# Patient Record
Sex: FEMALE | Race: WHITE | URBAN - METROPOLITAN AREA
[De-identification: names, ages, dates, MRNs, and addresses within clinical notes are randomized per-mention and may not be internally consistent; named-entity substitution may affect disease eponyms.]

---

## 2017-03-01 ENCOUNTER — OFFICE VISIT (OUTPATIENT)
Dept: FAMILY MEDICINE CLINIC | Age: 42
End: 2017-03-01

## 2017-03-01 ENCOUNTER — HOSPITAL ENCOUNTER (OUTPATIENT)
Dept: LAB | Age: 42
Discharge: HOME | End: 2017-03-01
Payer: COMMERCIAL

## 2017-03-01 VITALS
SYSTOLIC BLOOD PRESSURE: 100 MMHG | HEART RATE: 73 BPM | RESPIRATION RATE: 20 BRPM | TEMPERATURE: 99.4 F | WEIGHT: 113.2 LBS | DIASTOLIC BLOOD PRESSURE: 72 MMHG | OXYGEN SATURATION: 98 % | HEIGHT: 66 IN | BODY MASS INDEX: 18.19 KG/M2

## 2017-03-01 DIAGNOSIS — R04.0 EPISTAXIS: ICD-10-CM

## 2017-03-01 DIAGNOSIS — N94.6 MENSTRUAL CRAMP: ICD-10-CM

## 2017-03-01 DIAGNOSIS — E05.90 HYPERTHYROIDISM: Primary | ICD-10-CM

## 2017-03-01 DIAGNOSIS — E05.90 HYPERTHYROIDISM: ICD-10-CM

## 2017-03-01 LAB
T3FREE SERPL-MCNC: 3.3 PG/ML (ref 2.3–4.2)
T4 FREE SERPL-MCNC: 1.3 NG/DL (ref 0.7–1.5)
TSH SERPL DL<=0.05 MIU/L-ACNC: 0.18 UIU/ML (ref 0.36–3.74)

## 2017-03-01 PROCEDURE — 84443 ASSAY THYROID STIM HORMONE: CPT | Performed by: INTERNAL MEDICINE

## 2017-03-01 PROCEDURE — 84481 FREE ASSAY (FT-3): CPT | Performed by: INTERNAL MEDICINE

## 2017-03-01 PROCEDURE — 36415 COLL VENOUS BLD VENIPUNCTURE: CPT | Performed by: INTERNAL MEDICINE

## 2017-03-01 PROCEDURE — 84439 ASSAY OF FREE THYROXINE: CPT | Performed by: INTERNAL MEDICINE

## 2017-03-01 RX ORDER — HYDROCODONE BITARTRATE AND ACETAMINOPHEN 5; 325 MG/1; MG/1
1 TABLET ORAL
Qty: 30 TAB | Refills: 0 | Status: SHIPPED | OUTPATIENT
Start: 2017-03-01

## 2017-03-01 NOTE — PROGRESS NOTES
Assessment/Plan:    1. Menstrual cramp  -refilled  - HYDROcodone-acetaminophen (NORCO) 5-325 mg per tablet; Take 1 Tab by mouth daily as needed for Pain. Dispense: 30 Tab; Refill: 0    2. Hyperthyroidism, subclinical.  Consider thyroid uptake scan if remains abnormal.  - TSH 3RD GENERATION; Future  - T4, FREE; Future  - T3, FREE; Future    3. Epistaxis  -trial saline nasal spray    The plan was discussed with the patient. The patient verbalized understanding and is in agreement with the plan. All medication potential side effects were discussed with the patient. Health Maintenance:   Health Maintenance   Topic Date Due    DTaP/Tdap/Td series (1 - Tdap) 06/17/1998    INFLUENZA AGE 9 TO ADULT  08/01/2016    PAP AKA CERVICAL CYTOLOGY  07/01/2018     Cora Vera is a 46 y.o. female and presents with Medication Refill     Subjective:  Pt notes this past weekend, she had some abd pain. No n/v/diarrhea. Now resolved. About a month ago, she had a \"bad cold\" one month ago. Now resolve. However, since then she's been having nose bleeds in am.    Subclinical hyperthyroid - tsh low, t4/t3 nml. Thyroid Ab neg. Was sent to endo, but she didn't have good experience. Was supposed to get thyroid uptake scan, but had CT for suspected appendicitis and that got put off. She is requesting refill of norco for menstrual cramps    ROS:  Constitutional: No recent weight change. No weakness/fatigue. No f/c. Skin: No rashes, change in nails/hair, itching   HENT: No HA, dizziness. No hearing loss/tinnitus. No nasal congestion/discharge. +epistaxis   Eyes: No change in vision, double/blurred vision or eye pain/redness. Cardiovascular: No CP/palpitations. No RUSS/orthopnea/PND. Respiratory: No cough/sputum, dyspnea, wheezing. Gastointestinal: No dysphagia, reflux. No n/v. No constipation/diarrhea. No melena/rectal bleeding. Genitourinary: No dysuria, urinary hesitancy, nocturia, hematuria.   No incontinence. Musculoskeletal: No joint pain/stiffness. No muscle pain/tenderness. Endo: No heat/cold intolerance, no polyuria/polydypsia. Heme: No h/o anemia. No easy bleeding/bruising. Allergy/Immunology: No seasonal rhinitis. Denies frequent colds, sinus/ear infections. Neurological: No seizures/numbness/weakness. No paresthesias. Psychiatric:  No depression, anxiety. The problem list was updated as a part of today's visit. Patient Active Problem List   Diagnosis Code    Underweight R63.6    Hyperthyroidism E05.90    Diarrhea R19.7       The PSH, FH were reviewed. SH:  Social History   Substance Use Topics    Smoking status: Former Smoker    Smokeless tobacco: Never Used    Alcohol use No       Medications/Allergies:  Current Outpatient Prescriptions on File Prior to Visit   Medication Sig Dispense Refill    HYDROcodone-acetaminophen (NORCO) 5-325 mg per tablet Take 1 Tab by mouth daily as needed for Pain. 30 Tab 0    levonorgestrel-ethinyl estradiol (SEASONALE CONTRACEPTIVE) 0.15-30 mg-mcg per tablet Take  by mouth daily.  ibuprofen (MOTRIN) 800 mg tablet Take  by mouth every eight (8) hours as needed for Pain. No current facility-administered medications on file prior to visit. Allergies   Allergen Reactions    Erythromycin Nausea and Vomiting       Objective:  Visit Vitals    /72 (BP 1 Location: Left arm, BP Patient Position: Sitting)    Pulse 73    Temp 99.4 °F (37.4 °C) (Temporal)    Resp 20    Ht 5' 6\" (1.676 m)    Wt 113 lb 3.2 oz (51.3 kg)    LMP 02/01/2017    SpO2 98%    BMI 18.27 kg/m2      Constitutional: Well developed, nourished, no distress, alert, thin   HENT: Exterior ears and tympanic membranes normal bilaterally. Supple neck. No thyromegaly or lymphadenopathy. Oropharynx clear and moist mucous membranes. No obvious source of bleeding. Eyes: Conjunctiva normal. PERRL. CV: S1, S2.  RRR. No murmurs/rubs. No thrills palpated. No carotid bruits. Intact distal pulses. No edema. Pulm: No abnormalities on inspection. Clear to auscultation bilaterally. No wheezing/rhonchi. Normal effort. Labwork and Ancillary Studies:    CBC w/Diff  Lab Results   Component Value Date/Time    WBC 4.6 06/20/2016 10:25 AM    HGB 13.1 06/20/2016 10:25 AM    PLATELET 937 81/87/3003 10:25 AM         Basic Metabolic Profile/LFTs  Lab Results   Component Value Date/Time    Sodium 140 06/20/2016 10:25 AM    Potassium 4.2 06/20/2016 10:25 AM    Chloride 106 06/20/2016 10:25 AM    CO2 26 06/20/2016 10:25 AM    Anion gap 8 06/20/2016 10:25 AM    Glucose 82 06/20/2016 10:25 AM    BUN 10 06/20/2016 10:25 AM    Creatinine 0.66 06/20/2016 10:25 AM    BUN/Creatinine ratio 15 06/20/2016 10:25 AM    GFR est AA >60 06/20/2016 10:25 AM    GFR est non-AA >60 06/20/2016 10:25 AM    Calcium 8.4 06/20/2016 10:25 AM      Lab Results   Component Value Date/Time    ALT (SGPT) 11 06/20/2016 10:25 AM    AST (SGOT) 12 06/20/2016 10:25 AM    Alk.  phosphatase 34 06/20/2016 10:25 AM    Bilirubin, total 0.6 06/20/2016 10:25 AM       Cholesterol  Lab Results   Component Value Date/Time    Cholesterol, total 204 06/20/2016 10:25 AM    HDL Cholesterol 73 06/20/2016 10:25 AM    LDL, calculated 114.6 06/20/2016 10:25 AM    Triglyceride 82 06/20/2016 10:25 AM    CHOL/HDL Ratio 2.8 06/20/2016 10:25 AM

## 2017-03-01 NOTE — MR AVS SNAPSHOT
Visit Information Date & Time Provider Department Dept. Phone Encounter #  
 3/1/2017  9:15 AM Charlette Fabian, 3 [facility] [phone] [id number] Upcoming Health Maintenance Date Due DTaP/Tdap/Td series (1 - Tdap) 6/17/1998 INFLUENZA AGE 9 TO ADULT 8/1/2016 PAP AKA CERVICAL CYTOLOGY 7/1/2018 Allergies as of 3/1/2017  Review Complete On: 3/1/2017 By: Charlette Fabian MD  
  
 Severity Noted Reaction Type Reactions Erythromycin  01/22/2015    Nausea and Vomiting Current Immunizations  Never Reviewed No immunizations on file. Not reviewed this visit You Were Diagnosed With   
  
 Codes Comments Hyperthyroidism    -  Primary ICD-10-CM: E05.90 ICD-9-CM: 242.90 Menstrual cramp     ICD-10-CM: N94.6 ICD-9-CM: 754. 3 Vitals BP  
  
  
  
  
  
 100/72 (BP 1 Location: Left arm, BP Patient Position: Sitting) BMI and BSA Data Body Mass Index Body Surface Area  
 18.27 kg/m 2 1.55 m 2 Your Updated Medication List  
  
   
This list is accurate as of: 3/1/17  9:41 AM.  Always use your most recent med list.  
  
  
  
  
 HYDROcodone-acetaminophen 5-325 mg per tablet Commonly known as:  Saul Ill Take 1 Tab by mouth daily as needed for Pain. ibuprofen 800 mg tablet Commonly known as:  MOTRIN Take  by mouth every eight (8) hours as needed for Pain. SEASONALE CONTRACEPTIVE 0.15 mg-30 mcg 3mpk Generic drug:  levonorgestrel-ethinyl estradiol Take  by mouth daily. Prescriptions Printed Refills HYDROcodone-acetaminophen (NORCO) 5-325 mg per tablet 0 Sig: Take 1 Tab by mouth daily as needed for Pain. Class: Print Route: Oral  
  
To-Do List   
 03/01/2017 Lab:  T3, FREE   
  
 03/01/2017 Lab:  T4, FREE   
  
 03/01/2017 Lab:  TSH 3RD GENERATION [facility]cing [facility]!    
 Dear Hermes Anand: 
 Thank you for requesting a [employer] account. Our records indicate that you already have an active [employer] account. You can access your account anytime at [email or web address] Did you know that you can access your hospital and ER discharge instructions at any time in [employer]? You can also review all of your test results from your hospital stay or ER visit. Additional Information If you have questions, please visit the Frequently Asked Questions section of the [employer] website at [email or web address] Remember, [employer] is NOT to be used for urgent needs. For medical emergencies, dial 911. Now available from your iPhone and Android! Please provide this summary of care documentation to your next provider. Your primary care clinician is listed as Bhavani Olsen. If you have any questions after today's visit, please call [phone].

## 2017-03-01 NOTE — PROGRESS NOTES
Rosalba Rust is a 46 y.o. female  Chief Complaint   Patient presents with    Medication Refill     1. Have you been to the ER, urgent care clinic since your last visit? Hospitalized since your last visit? No    2. Have you seen or consulted any other health care providers outside of the [address] [place] [address] since your last visit? Include any pap smears or colon screening.  No

## 2020-11-25 ENCOUNTER — TELEPHONE (OUTPATIENT)
Dept: FAMILY MEDICINE CLINIC | Age: 45
End: 2020-11-25

## 2020-11-25 NOTE — TELEPHONE ENCOUNTER
Returned call, no answer, straight to vm. Left msg for pt that she would need to be seen for any med could be written. No visit since 2017. She can do a vv today or schedule in the next few weeks. Pt to call back for appt.

## 2020-11-25 NOTE — TELEPHONE ENCOUNTER
----- Message from Jenni Esposito sent at 11/25/2020  8:08 AM EST -----  Patient requesting medication refill for nausea. Jack Nguyễn

## 2020-12-07 ENCOUNTER — TELEPHONE (OUTPATIENT)
Dept: FAMILY MEDICINE CLINIC | Age: 45
End: 2020-12-07

## 2020-12-07 NOTE — TELEPHONE ENCOUNTER
Pt called in, finishing up quarantine for self and  positive for COVID. Pt's child broke out in hives all over yesterday, states he usually does that the end of a virus. He has nausea and congestion. She paid $200 for her COVID test so she didn't get her son tested. Unsure if she should go back to work tomorrow. Still has no sense of taste and has some congestion. I asked her to call her son's pediatrician to see if they want to see him and if she needs to extend her leave she will need to call back for vv with Dr. James Hinkle. Pt verbalized understanding.